# Patient Record
Sex: FEMALE | Race: ASIAN | NOT HISPANIC OR LATINO
[De-identification: names, ages, dates, MRNs, and addresses within clinical notes are randomized per-mention and may not be internally consistent; named-entity substitution may affect disease eponyms.]

---

## 2020-10-06 PROBLEM — Z00.00 ENCOUNTER FOR PREVENTIVE HEALTH EXAMINATION: Status: ACTIVE | Noted: 2020-10-06

## 2020-10-22 PROBLEM — N64.4 MASTALGIA: Status: ACTIVE | Noted: 2020-10-22

## 2020-10-22 PROBLEM — Z78.9 NO FAMILY HISTORY OF MALIGNANT NEOPLASM OF BREAST: Status: ACTIVE | Noted: 2020-10-22

## 2020-10-23 ENCOUNTER — APPOINTMENT (OUTPATIENT)
Dept: BREAST CENTER | Facility: CLINIC | Age: 50
End: 2020-10-23
Payer: MEDICAID

## 2020-10-23 VITALS
BODY MASS INDEX: 29.84 KG/M2 | HEART RATE: 62 BPM | HEIGHT: 59 IN | SYSTOLIC BLOOD PRESSURE: 149 MMHG | DIASTOLIC BLOOD PRESSURE: 94 MMHG | TEMPERATURE: 97.7 F | WEIGHT: 148 LBS

## 2020-10-23 DIAGNOSIS — E78.00 PURE HYPERCHOLESTEROLEMIA, UNSPECIFIED: ICD-10-CM

## 2020-10-23 DIAGNOSIS — N64.4 MASTODYNIA: ICD-10-CM

## 2020-10-23 DIAGNOSIS — Z78.9 OTHER SPECIFIED HEALTH STATUS: ICD-10-CM

## 2020-10-23 PROCEDURE — 99072 ADDL SUPL MATRL&STAF TM PHE: CPT

## 2020-10-23 PROCEDURE — 99203 OFFICE O/P NEW LOW 30 MIN: CPT

## 2020-10-23 RX ORDER — MULTIVIT-MIN/IRON/FOLIC ACID/K 18-600-40
CAPSULE ORAL
Refills: 0 | Status: ACTIVE | COMMUNITY

## 2020-10-23 NOTE — HISTORY OF PRESENT ILLNESS
[FreeTextEntry1] : Pt is a 51 y/o female, here for consultation visit, referred by Dr. eT Michelle for bilateral breast pain. Pt reports had right breast pain since last mammogram in August. Had been taking Tylenol or Motrin to help her sleep with relief.  Denies palpable masses, nipple d/c or pain to L breast. Pain has subsided in the last 2 weeks. Also had some discomfort and burning in her breast followed by vag spotting. Saw GYN and all neg. Pt is perimenopausal and still having irregular spotting.\par Recent mmg/US BR2 with multiple cheo stable nodules, BR2.\par Pt reports Hx of 2 benign Bx to Right breast > 10 yrs ago\par No FHx breast cancer or other cancers.\par \par --8/20/20 [facility] SmmgT/US: compared to priors to 2017, dense, R upper clip noted, UOQ stable nodule, benign LN noted.BR2\par --8/20/20 [facility] US: priors to 2018, no axillary lymphadenopathy,\par      R 12:00 N1 (1.5cm) stable hypoechoic lesion,\par          8:00 N6 previously seen lesion not visualized,\par          8-9:00 N3 (1.3cm) stable hypoechoic lesion,\par         10:00 N7 (0.8cm) stable hypoechoic lesion;\par     L 1-2:00 N4 (0.3cm) stable hypoechoic lesion,\par        4:00 PA previously see lesion not discreetly visualized,\par       11:00-12:00 N4 previously seen lesion not visualized. BR2\par

## 2020-10-23 NOTE — ASSESSMENT
[FreeTextEntry1] : Pt is a 49 y/o female, here for consultation visit, referred by Dr. Te Michelle for bilateral breast pain. Former [facility] pt. Pt reports had right breast pain since last mammogram in August. Had been taking Tylenol or Motrin to help her sleep with relief.  Denies palpable masses, nipple d/c or pain to L breast. Pain has subsided in the last 2 weeks. Also had some discomfort and burning in her breast followed by vag spotting. Saw GYN and all neg. Pt is perimenopausal and still having irregular spotting.\par Recent mmg/US BR2 with multiple cheo stable nodules, BR2.\par Pt reports Hx of 2 benign Bx to Right breast > 10 yrs ago\par No FHx breast cancer or other cancers.\par \par --8/20/20 [facility] Smmg[facility]/US: compared to priors to 2017, dense, R upper clip noted, UOQ stable nodule, benign LN noted.BR2\par --8/20/20 [facility] US: priors to 2018, no axillary lymphadenopathy,\par      R 12:00 N1 (1.5cm) stable hypoechoic lesion,\par          8:00 N6 previously seen lesion not visualized,\par          8-9:00 N3 (1.3cm) stable hypoechoic lesion,\par         10:00 N7 (0.8cm) stable hypoechoic lesion;\par     L 1-2:00 N4 (0.3cm) stable hypoechoic lesion,\par        4:00 PA previously see lesion not discreetly visualized,\par       11:00-12:00 N4 previously seen lesion not visualized. BR2\par CBE: FC changes.  12:00 right nodule correlates with u/s stable lesion.  No adenopathy. \par \par Reviewed recent mmg/US results with recommendations for annual screening.  Mastalgia is now dissipated.  Maybe attributed to perimenopausal. Discussed with pt nodules on u/s seen but stable since 2018, no suspicious findings on CBE.  Discussed lifestyle modifications such as limiting caffeine intake. Discussed possible relief in sx once pt becomes menopausal. Reviewed with pt signs and symptoms of breast cancer including, palpable mass, watery or bloody nipple d/c, skin changes, dimpling.

## 2020-10-23 NOTE — DATA REVIEWED
[FreeTextEntry1] : --8/20/20 [facility] Smmg[facility]/US: compared to priors to 2017, dense, R upper clip noted, UOQ stable nodule, benign LN noted.BR2\par \par --8/20/20 [facility] US: priors to 2018, no axillary lymphadenopathy,\par      R 12:00 N1 (1.5cm) stable hypoechoic lesion,\par          8:00 N6 previously seen lesion not visualized,\par          8-9:00 N3 (1.3cm) stable hypoechoic lesion,\par         10:00 N7 (0.8cm) stable hypoechoic lesion;\par     L 1-2:00 N4 (0.3cm) stable hypoechoic lesion,\par        4:00 PA previously see lesion not discreetly visualized,\par       11:00-12:00 N4 previously seen lesion not visualized. BR2

## 2020-10-23 NOTE — PAST MEDICAL HISTORY
[Postmenopausal] : The patient is postmenopausal [Menopause Age____] : age at menopause was [unfilled] [Definite ___ (Date)] : the last menstrual period was [unfilled] [Total Preg ___] : G[unfilled] [Living ___] : Living: [unfilled] [Age At Live Birth ___] : Age at live birth: [unfilled] [Menarche Age ____] : age at menarche was [unfilled] [History of Hormone Replacement Treatment] : has no history of hormone replacement treatment [FreeTextEntry5] : C/S x2 [FreeTextEntry7] : no [FreeTextEntry8] : 3 mo

## 2020-10-23 NOTE — PHYSICAL EXAM
[Bra Size: ___] : Bra Size: [unfilled] [Normocephalic] : normocephalic [Atraumatic] : atraumatic [Supple] : supple [No Supraclavicular Adenopathy] : no supraclavicular adenopathy [No Thyromegaly] : no thyromegaly [Examined in the supine and seated position] : examined in the supine and seated position [No dominant masses] : no dominant masses in right breast  [No dominant masses] : no dominant masses left breast [No Nipple Retraction] : no left nipple retraction [No Nipple Discharge] : no left nipple discharge [No Axillary Lymphadenopathy] : no left axillary lymphadenopathy [No Edema] : no edema [No Swelling] : no swelling [Full ROM] : full range of motion [No Rashes] : no rashes [No Ulceration] : no ulceration [Breast Nipple Inversion] : nipples not inverted [Breast Nipple Retraction] : nipples not retracted [Breast Nipple Flattening] : nipples not flattened [Breast Nipple Fissures] : nipples not fissured [Breast Abnormal Lactation (Galactorrhea)] : no galactorrhea [Breast Abnormal Secretion Bloody Fluid] : no bloody discharge [Breast Abnormal Secretion Serous Fluid] : no serous discharge [Breast Abnormal Secretion Opalescent Fluid] : no milky discharge [de-identified] : FC changes, 12 palp nodule 1cm [de-identified] : FC changes

## 2024-01-05 ENCOUNTER — APPOINTMENT (OUTPATIENT)
Dept: SURGERY | Facility: CLINIC | Age: 54
End: 2024-01-05
Payer: MEDICAID

## 2024-01-05 VITALS
WEIGHT: 148 LBS | SYSTOLIC BLOOD PRESSURE: 122 MMHG | OXYGEN SATURATION: 97 % | BODY MASS INDEX: 29.89 KG/M2 | RESPIRATION RATE: 18 BRPM | TEMPERATURE: 97.8 F | DIASTOLIC BLOOD PRESSURE: 83 MMHG | HEART RATE: 67 BPM

## 2024-01-05 DIAGNOSIS — R22.2 LOCALIZED SWELLING, MASS AND LUMP, TRUNK: ICD-10-CM

## 2024-01-05 PROCEDURE — 99203 OFFICE O/P NEW LOW 30 MIN: CPT

## 2024-01-05 NOTE — ASSESSMENT
[FreeTextEntry1] : 53 year old woman with mass vs hernia of RLQ, with mass being more likely, although at this time atypical for lipoma

## 2024-01-05 NOTE — PLAN
[FreeTextEntry1] : - I spoke with the patient regarding the findings, which at this time are most consistent with a mass, however given the location, and firmness and size, cannot rule out hernia - Therefore will obtain CT abdomen/pelvis to rule out hernia vs mass - Patient was agreeable with this plan and all her questions were answered to her apparent satisfaction

## 2024-01-05 NOTE — PHYSICAL EXAM
[Respiratory Effort] : normal respiratory effort [Normal Rate and Rhythm] : normal rate and rhythm [Alert] : alert [Oriented to Person] : oriented to person [Oriented to Place] : oriented to place [Oriented to Time] : oriented to time [Calm] : calm [de-identified] : NAD [de-identified] : Soft, non-distended, non-TTP in all quadrants [de-identified] : Approximately 4 cm mass with some discoloration at the skin, firm, non-TTP

## 2024-01-05 NOTE — HISTORY OF PRESENT ILLNESS
[de-identified] : German translation via Carlos Whitt  Patient is a 53 year old woman with PMHx of HLD, PSHx of  x 2 and ovarian cyst removal who presents with mass of the right lower abdominal wall. States that she first noticed it shortly after her last  approximately 20 years ago, but did not worry about it as it was asymptomatic. She denies any changes with menses, but does note that in the past few months it feels like it has grown, which caused her concern. She denies any other symptoms. Denies smoking/EtOH, no drug use. NKDA.

## 2024-01-15 ENCOUNTER — RESULT REVIEW (OUTPATIENT)
Age: 54
End: 2024-01-15

## 2024-01-15 ENCOUNTER — OUTPATIENT (OUTPATIENT)
Dept: OUTPATIENT SERVICES | Facility: HOSPITAL | Age: 54
LOS: 1 days | End: 2024-01-15
Payer: COMMERCIAL

## 2024-01-15 ENCOUNTER — APPOINTMENT (OUTPATIENT)
Dept: CT IMAGING | Facility: CLINIC | Age: 54
End: 2024-01-15
Payer: COMMERCIAL

## 2024-01-15 DIAGNOSIS — R22.2 LOCALIZED SWELLING, MASS AND LUMP, TRUNK: ICD-10-CM

## 2024-01-15 PROCEDURE — 74177 CT ABD & PELVIS W/CONTRAST: CPT | Mod: 26

## 2024-01-15 PROCEDURE — 74177 CT ABD & PELVIS W/CONTRAST: CPT

## 2024-01-30 ENCOUNTER — APPOINTMENT (OUTPATIENT)
Dept: SURGERY | Facility: CLINIC | Age: 54
End: 2024-01-30
Payer: COMMERCIAL

## 2024-01-30 VITALS
OXYGEN SATURATION: 97 % | WEIGHT: 152 LBS | DIASTOLIC BLOOD PRESSURE: 84 MMHG | BODY MASS INDEX: 30.7 KG/M2 | SYSTOLIC BLOOD PRESSURE: 136 MMHG | TEMPERATURE: 97.2 F | RESPIRATION RATE: 18 BRPM | HEART RATE: 63 BPM

## 2024-01-30 PROCEDURE — 99214 OFFICE O/P EST MOD 30 MIN: CPT

## 2024-01-30 NOTE — HISTORY OF PRESENT ILLNESS
[de-identified] : Bulgarian translation via Carlos Whitt  Since last seen patient underwent CT abdomen/pelvis which demonstrated a 3.0 x 2.6 cm well-circumscribed nodule in the subcutaneous fat of the anterior lower abdominal wall, possibly representing a large sebaceous cyst, but remains indeterminant. This was reviewed with the patient. Denies any other complaints at this time.

## 2024-01-30 NOTE — PLAN
[FreeTextEntry1] : - I reviewed the findings with the patient and offered her left abdominal wall mass excision - We discussed the indications, risks, benefits and alternatives of the procedure, with risks including but not limited to bleeding, infection, recurrence, to which the patient stated that she understood, gave consent, and all her questions were answered to her apparent satisfaction - Surgical planning

## 2024-01-30 NOTE — PHYSICAL EXAM
[Respiratory Effort] : normal respiratory effort [Normal Rate and Rhythm] : normal rate and rhythm [Alert] : alert [Oriented to Person] : oriented to person [Oriented to Place] : oriented to place [Oriented to Time] : oriented to time [Calm] : calm [de-identified] : NAD [de-identified] : Soft, non-distended, non-TTP in all quadrants [de-identified] : Approximately 4 cm mass with discoloration at skin without erythema, mobile, non-TTP

## 2024-02-27 ENCOUNTER — APPOINTMENT (OUTPATIENT)
Dept: SURGERY | Facility: CLINIC | Age: 54
End: 2024-02-27
Payer: COMMERCIAL

## 2024-02-27 VITALS
BODY MASS INDEX: 30.64 KG/M2 | HEIGHT: 59 IN | TEMPERATURE: 97.2 F | HEART RATE: 64 BPM | OXYGEN SATURATION: 99 % | SYSTOLIC BLOOD PRESSURE: 129 MMHG | DIASTOLIC BLOOD PRESSURE: 84 MMHG | WEIGHT: 152 LBS

## 2024-02-27 PROCEDURE — 21552 EXC NECK LES SC 3 CM/>: CPT

## 2024-02-27 NOTE — PROCEDURE
[FreeTextEntry1] : Patient is a 53 year old woman who presents for right abdominal mass excision. Prior to the procedure being performed, consent was obtained with Dr. Eyal Whitt providing Nepali translation, and time out with correct patient and procedure and laterality was conducted.  Began by prepping and draping the site in the usual sterile fashion. Approximately 10 cc of 1% lidocaine and 10 cc of 0.5% marcaine and after local anesthesia was confirmed, a 4 cm elliptical incision was created. An approximately 3 cm mass was identified and circumferentially dissected from surrounding subcutaneous tissue and sent for pathology. Hemostasis was obtained. Wound was irrigated and then closed using 2-0 Vicryl suture in an interrupted fashion and 3-0 Vicryl suture in an interrupted fashion x 3. Steristrip and dressing was applied. Patient tolerated procedure well and postprocedure vitals were remeasured and found to be normal.

## 2024-03-08 LAB — CORE LAB BIOPSY: NORMAL

## 2024-03-15 ENCOUNTER — APPOINTMENT (OUTPATIENT)
Dept: SURGERY | Facility: CLINIC | Age: 54
End: 2024-03-15
Payer: COMMERCIAL

## 2024-03-15 VITALS
OXYGEN SATURATION: 95 % | WEIGHT: 150 LBS | DIASTOLIC BLOOD PRESSURE: 84 MMHG | HEART RATE: 76 BPM | RESPIRATION RATE: 18 BRPM | SYSTOLIC BLOOD PRESSURE: 127 MMHG | TEMPERATURE: 97.1 F | BODY MASS INDEX: 30.3 KG/M2

## 2024-03-15 PROCEDURE — 99024 POSTOP FOLLOW-UP VISIT: CPT

## 2024-03-15 NOTE — HISTORY OF PRESENT ILLNESS
[de-identified] : Yakut translation via Carlos Whitt  Patient is a 53 year old woman who underwent right abdominal wall mass excision on 2/27 who presents for follow up. States that she feels well, denies pain. No other complaints.  Pathology reveals Schwannoma, which was reviewed with the patient.  Abdominal exam reveals well-healing incision, no erythema/drainage, non-TTP in all quadrants, no rebound/guarding  Patient is healing well and advised to keep site C/D/I. Return PRN.

## 2024-08-05 ENCOUNTER — NON-APPOINTMENT (OUTPATIENT)
Age: 54
End: 2024-08-05

## 2024-08-05 ENCOUNTER — APPOINTMENT (OUTPATIENT)
Dept: OTOLARYNGOLOGY | Facility: CLINIC | Age: 54
End: 2024-08-05

## 2024-08-05 PROCEDURE — 92567 TYMPANOMETRY: CPT

## 2024-08-05 PROCEDURE — 92588 EVOKED AUDITORY TST COMPLETE: CPT

## 2024-08-05 PROCEDURE — 92557 COMPREHENSIVE HEARING TEST: CPT

## 2024-08-05 PROCEDURE — 99204 OFFICE O/P NEW MOD 45 MIN: CPT | Mod: 25

## 2024-08-05 NOTE — ASSESSMENT
[FreeTextEntry1] : Left Sudden SNHL and Tinnitus  not responsive to steroids Rec  sensorineural hearing loss-cleared for hearing aids I requested a copy of the MRI report Avoid loud noises  Ear pain 2/2 TMJ Rec DDS eval and acupuncture  f/u 6 months

## 2024-08-05 NOTE — REASON FOR VISIT
[Initial Evaluation] : an initial evaluation for [Hearing Loss] : hearing loss [Tinnitus] : tinnitus [Family Member] : family member

## 2024-08-05 NOTE — DATA REVIEWED
[de-identified] : Hearing Test performed to evaluate the extent of hearing loss and  to explain pt's symptoms today's hearing test was personally reviewed and revealed [id number] Hearing-Left SNHL

## 2024-08-05 NOTE — PHYSICAL EXAM
[de-identified] : muscle tension and also muscle tension in neck [] : septum deviated bilaterally [Midline] : trachea located in midline position [Normal] : no rashes

## 2024-08-05 NOTE — HISTORY OF PRESENT ILLNESS
[de-identified] : 54 yo 1 yr h/o left sided sudden Hearing Loss w/ assoc tinnitus No responsive to Po or IT steroids [id number] Also c/o bilat ear and neck pain no other modifying factors no other nasal or throat complaints

## 2025-02-03 ENCOUNTER — APPOINTMENT (OUTPATIENT)
Dept: OTOLARYNGOLOGY | Facility: CLINIC | Age: 55
End: 2025-02-03
Payer: COMMERCIAL

## 2025-02-03 DIAGNOSIS — H61.23 IMPACTED CERUMEN, BILATERAL: ICD-10-CM

## 2025-02-03 DIAGNOSIS — M26.609 UNSPECIFIED TEMPOROMANDIBULAR JOINT DISORDER: ICD-10-CM

## 2025-02-03 DIAGNOSIS — H90.3 SENSORINEURAL HEARING LOSS, BILATERAL: ICD-10-CM

## 2025-02-03 DIAGNOSIS — H93.12 TINNITUS, LEFT EAR: ICD-10-CM

## 2025-02-03 PROCEDURE — 92557 COMPREHENSIVE HEARING TEST: CPT

## 2025-02-03 PROCEDURE — 99213 OFFICE O/P EST LOW 20 MIN: CPT | Mod: 25

## 2025-02-03 PROCEDURE — 92567 TYMPANOMETRY: CPT
